# Patient Record
Sex: FEMALE | Race: WHITE | NOT HISPANIC OR LATINO | Employment: OTHER | URBAN - METROPOLITAN AREA
[De-identification: names, ages, dates, MRNs, and addresses within clinical notes are randomized per-mention and may not be internally consistent; named-entity substitution may affect disease eponyms.]

---

## 2017-10-30 ENCOUNTER — OFFICE VISIT (OUTPATIENT)
Dept: ORTHOPEDICS | Facility: CLINIC | Age: 67
End: 2017-10-30
Payer: MEDICARE

## 2017-10-30 VITALS
WEIGHT: 164 LBS | HEART RATE: 62 BPM | DIASTOLIC BLOOD PRESSURE: 64 MMHG | HEIGHT: 62 IN | SYSTOLIC BLOOD PRESSURE: 118 MMHG | BODY MASS INDEX: 30.18 KG/M2

## 2017-10-30 DIAGNOSIS — M54.31 SCIATICA OF RIGHT SIDE: Primary | ICD-10-CM

## 2017-10-30 PROCEDURE — 99213 OFFICE O/P EST LOW 20 MIN: CPT | Mod: ,,, | Performed by: ORTHOPAEDIC SURGERY

## 2017-10-30 PROCEDURE — 72100 X-RAY EXAM L-S SPINE 2/3 VWS: CPT | Mod: ,,, | Performed by: ORTHOPAEDIC SURGERY

## 2017-10-30 RX ORDER — EMPAGLIFLOZIN 25 MG/1
1 TABLET, FILM COATED ORAL DAILY
COMMUNITY
Start: 2017-10-25 | End: 2019-04-24 | Stop reason: SDUPTHER

## 2017-10-30 RX ORDER — PANTOPRAZOLE SODIUM 40 MG/1
40 TABLET, DELAYED RELEASE ORAL
COMMUNITY
Start: 2017-09-30 | End: 2021-02-15 | Stop reason: SDUPTHER

## 2017-10-30 RX ORDER — METHYLPREDNISOLONE 4 MG/1
TABLET ORAL
Qty: 1 PACKAGE | Refills: 0 | Status: SHIPPED | OUTPATIENT
Start: 2017-10-30 | End: 2019-04-24

## 2017-10-30 RX ORDER — HYDROCHLOROTHIAZIDE 25 MG/1
TABLET ORAL
COMMUNITY
Start: 2017-10-16 | End: 2019-04-24

## 2017-10-30 RX ORDER — CYCLOSPORINE 0.5 MG/ML
1 EMULSION OPHTHALMIC 2 TIMES DAILY
COMMUNITY

## 2017-10-30 RX ORDER — HYDROCORTISONE 25 MG/G
CREAM TOPICAL
COMMUNITY
Start: 2017-10-05 | End: 2019-04-24

## 2017-10-30 RX ORDER — GLIPIZIDE 5 MG/1
5 TABLET, FILM COATED, EXTENDED RELEASE ORAL
COMMUNITY
End: 2019-04-24 | Stop reason: SDUPTHER

## 2017-10-30 RX ORDER — LEVOCETIRIZINE DIHYDROCHLORIDE 5 MG/1
5 TABLET, FILM COATED ORAL DAILY PRN
COMMUNITY

## 2017-10-30 NOTE — PROGRESS NOTES
Past Medical History:   Diagnosis Date    Diabetes mellitus     Hypertension        Past Surgical History:   Procedure Laterality Date     SECTION      ROTATOR CUFF REPAIR         Current Outpatient Prescriptions   Medication Sig    cycloSPORINE (RESTASIS) 0.05 % ophthalmic emulsion Apply to eye.    diltiazem (CARDIZEM CD) 180 MG 24 hr capsule Take 180 mg by mouth once daily.    glipiZIDE (GLUCOTROL) 5 MG TR24 Take by mouth.    hydroCHLOROthiazide (HYDRODIURIL) 25 MG tablet     hydrocortisone 2.5 % cream     JARDIANCE 25 mg Tab     levocetirizine (XYZAL) 5 MG tablet Take by mouth.    pantoprazole (PROTONIX) 40 MG tablet     SITagliptan-metformin (JANUMET) 50-1,000 mg per tablet Take by mouth.     No current facility-administered medications for this visit.        Review of patient's allergies indicates:  No Known Allergies    Family History   Problem Relation Age of Onset    Diabetes Mellitus      Heart disease      Cancer         Social History     Social History    Marital status:      Spouse name: N/A    Number of children: N/A    Years of education: N/A     Occupational History    Not on file.     Social History Main Topics    Smoking status: Current Every Day Smoker     Packs/day: 0.50     Types: Cigarettes    Smokeless tobacco: Never Used      Comment: 0.5 ppd x30 years or more    Alcohol use No    Drug use: No    Sexual activity: Not on file     Other Topics Concern    Not on file     Social History Narrative    No narrative on file       Chief Complaint:   Chief Complaint   Patient presents with    Leg Pain     Patient has Right leg pain in the Lower Gluteal area. She states it hurts to sit for any length of time. The pain has been present for about 2-3 weeks. At the end of the day the leg has stinging and numbness all the way down the back of the leg in to the calf.       Consulting Physician: No ref. provider found    History of Present Illness:    This is a 67  "y.o. year old female who complains of Patient's been having increasingright buttocks and posterior leg pain for last 2 weeks she did not this does not have any history of an injury she says her pain is sometimes  in 7 out of 10      ROS    Examination:    Vital Signs:    Vitals:    10/30/17 1048   BP: 118/64   Pulse: 62   Weight: 74.4 kg (164 lb)   Height: 5' 2" (1.575 m)   PainSc:   7   PainLoc: Buttocks       This a well-developed, well nourished patient in no acute distress.    Alert and oriented and cooperative to examination.       Physical Exam: back-patient is able to reverse her curve well on straight leg raising she has some mild discomfort in the buttocks and goes into the posterior thigh does not appear to go down below the knee her deep tendon reflexes are intact and symmetrical is no motor weakness    Imaging: X-rays ordered and reviewed today x-ray examination of lumbar spine shows an L4-5 spondylolisthesis with a slight slip of L4 and L5 on L5     Assessment: L4-5 spondylolisthesis with right sided sciatica    Plan:  Will start patient on a Medrol Dosepak given some back exercises and reevaluate in about 2 weeks      DISCLAIMER: This note may have been dictated using voice recognition software and may contain grammatical errors.     NOTE: Consult report sent to referring provider via [facility] EMR.  "

## 2017-11-13 ENCOUNTER — OFFICE VISIT (OUTPATIENT)
Dept: ORTHOPEDICS | Facility: CLINIC | Age: 67
End: 2017-11-13
Payer: MEDICARE

## 2017-11-13 VITALS
SYSTOLIC BLOOD PRESSURE: 112 MMHG | WEIGHT: 164 LBS | HEIGHT: 62 IN | DIASTOLIC BLOOD PRESSURE: 72 MMHG | BODY MASS INDEX: 30.18 KG/M2 | HEART RATE: 60 BPM

## 2017-11-13 DIAGNOSIS — M54.31 SCIATICA OF RIGHT SIDE: Primary | ICD-10-CM

## 2017-11-13 PROCEDURE — 99213 OFFICE O/P EST LOW 20 MIN: CPT | Mod: ,,, | Performed by: ORTHOPAEDIC SURGERY

## 2017-11-13 RX ORDER — AZITHROMYCIN 250 MG/1
TABLET, FILM COATED ORAL
COMMUNITY
Start: 2017-11-09 | End: 2019-04-24

## 2017-11-13 NOTE — PROGRESS NOTES
Past Medical History:   Diagnosis Date    Diabetes mellitus     Hypertension        Past Surgical History:   Procedure Laterality Date     SECTION      ROTATOR CUFF REPAIR         Current Outpatient Prescriptions   Medication Sig    azithromycin (Z-MELISSA) 250 MG tablet     cycloSPORINE (RESTASIS) 0.05 % ophthalmic emulsion Apply to eye.    diltiazem (CARDIZEM CD) 180 MG 24 hr capsule Take 180 mg by mouth once daily.    glipiZIDE (GLUCOTROL) 5 MG TR24 Take by mouth.    hydroCHLOROthiazide (HYDRODIURIL) 25 MG tablet     hydrocortisone 2.5 % cream     JARDIANCE 25 mg Tab     levocetirizine (XYZAL) 5 MG tablet Take by mouth.    methylPREDNISolone (MEDROL DOSEPACK) 4 mg tablet use as directed    pantoprazole (PROTONIX) 40 MG tablet     SITagliptan-metformin (JANUMET) 50-1,000 mg per tablet Take by mouth.     No current facility-administered medications for this visit.        Review of patient's allergies indicates:  No Known Allergies    Family History   Problem Relation Age of Onset    Diabetes Mellitus      Heart disease      Cancer         Social History     Social History    Marital status:      Spouse name: N/A    Number of children: N/A    Years of education: N/A     Occupational History    Not on file.     Social History Main Topics    Smoking status: Current Every Day Smoker     Packs/day: 0.50     Types: Cigarettes    Smokeless tobacco: Never Used      Comment: 0.5 ppd x30 years or more    Alcohol use No    Drug use: No    Sexual activity: Not on file     Other Topics Concern    Not on file     Social History Narrative    No narrative on file       Chief Complaint:   Chief Complaint   Patient presents with    Follow-up     Patient came in 10/30/2017,  for right side leg pain dx'd with sciatica pain and placed on Medrol dose pack and given back exercises. She is doing wel       Consulting Physician: No ref. provider found    History of Present Illness:    This is a 67  "y.o. year old female who complains of patient returns for follow-up of some right-sided sciatica which was treated with Medrol Dosepak she states she has no complaints of any pain today and feels what really well      ROS    Examination:    Vital Signs:    Vitals:    11/13/17 0913   BP: 112/72   Pulse: 60   Weight: 74.4 kg (164 lb)   Height: 5' 2" (1.575 m)   PainSc:   1   PainLoc: Leg       This a well-developed, well nourished patient in no acute distress.    Alert and oriented and cooperative to examination.       Physical Exam: back-patient straight leg raise is negative bilaterally she is neurologically intact she has no gross motor weakness    Imaging: X-rays ordered and reviewed today no x-rays done today     Assessment: right-sided sciatica resolved    Plan:  He patient has been given back exercises she is to take Tylenol and Aleve as needed and return as needed      DISCLAIMER: This note may have been dictated using voice recognition software and may contain grammatical errors.     NOTE: Consult report sent to referring provider via EP[facility] EMR.  "

## 2018-12-30 ENCOUNTER — OFFICE VISIT (OUTPATIENT)
Dept: URGENT CARE | Facility: CLINIC | Age: 68
End: 2018-12-30
Payer: MEDICARE

## 2018-12-30 VITALS
HEIGHT: 62 IN | DIASTOLIC BLOOD PRESSURE: 82 MMHG | BODY MASS INDEX: 30.18 KG/M2 | HEART RATE: 70 BPM | TEMPERATURE: 98 F | WEIGHT: 164 LBS | OXYGEN SATURATION: 95 % | SYSTOLIC BLOOD PRESSURE: 140 MMHG

## 2018-12-30 DIAGNOSIS — B96.89 BACTERIAL SINUSITIS: ICD-10-CM

## 2018-12-30 DIAGNOSIS — R09.81 NASAL CONGESTION: Primary | ICD-10-CM

## 2018-12-30 DIAGNOSIS — J32.9 BACTERIAL SINUSITIS: ICD-10-CM

## 2018-12-30 LAB
CTP QC/QA: YES
FLUAV AG NPH QL: NEGATIVE
FLUBV AG NPH QL: NEGATIVE

## 2018-12-30 PROCEDURE — 99203 OFFICE O/P NEW LOW 30 MIN: CPT | Mod: S$GLB,,, | Performed by: PHYSICIAN ASSISTANT

## 2018-12-30 PROCEDURE — 87804 INFLUENZA ASSAY W/OPTIC: CPT | Mod: QW,S$GLB,, | Performed by: PHYSICIAN ASSISTANT

## 2018-12-30 RX ORDER — AMOXICILLIN AND CLAVULANATE POTASSIUM 875; 125 MG/1; MG/1
1 TABLET, FILM COATED ORAL 2 TIMES DAILY
Qty: 20 TABLET | Refills: 0 | Status: SHIPPED | OUTPATIENT
Start: 2018-12-30 | End: 2019-01-09

## 2018-12-30 RX ORDER — BENZONATATE 100 MG/1
100 CAPSULE ORAL EVERY 6 HOURS PRN
Qty: 60 CAPSULE | Refills: 1 | Status: SHIPPED | OUTPATIENT
Start: 2018-12-30 | End: 2019-04-24

## 2018-12-30 NOTE — PROGRESS NOTES
"Subjective:       Patient ID: Monique Craven is a 68 y.o. female.    Vitals:  height is 5' 2" (1.575 m) and weight is 74.4 kg (164 lb). Her tympanic temperature is 98 °F (36.7 °C). Her blood pressure is 140/82 (abnormal) and her pulse is 70. Her oxygen saturation is 95%.     Chief Complaint: Sinus Problem    Monique started with sinus congestion about 7 days ago and since it has gotten worse.       Sinus Problem   This is a new problem. The current episode started in the past 7 days. The problem has been gradually worsening since onset. There has been no fever. Her pain is at a severity of 0/10. She is experiencing no pain. Associated symptoms include congestion, coughing and sinus pressure. Pertinent negatives include no chills, diaphoresis, ear pain, shortness of breath or sore throat. Past treatments include acetaminophen. The treatment provided mild relief.       Constitution: Negative for chills, sweating, fatigue and fever.   HENT: Positive for congestion and sinus pressure. Negative for ear pain, sinus pain, sore throat and voice change.    Neck: Negative for painful lymph nodes.   Eyes: Negative for eye redness.   Respiratory: Positive for cough. Negative for chest tightness, sputum production, bloody sputum, COPD, shortness of breath, stridor, wheezing and asthma.    Gastrointestinal: Negative for nausea and vomiting.   Musculoskeletal: Negative for muscle ache.   Skin: Negative for rash.   Allergic/Immunologic: Negative for seasonal allergies and asthma.   Hematologic/Lymphatic: Negative for swollen lymph nodes.       Objective:      Physical Exam   Constitutional: She is oriented to person, place, and time. She appears well-developed and well-nourished. She is cooperative.  Non-toxic appearance. She does not appear ill. No distress.   HENT:   Head: Normocephalic and atraumatic.   Right Ear: Hearing, external ear and ear canal normal. Tympanic membrane is bulging (Clear).   Left Ear: Hearing, external " ear and ear canal normal. Tympanic membrane is bulging (Clear).   Nose: No mucosal edema, rhinorrhea or nasal deformity. No epistaxis. Right sinus exhibits maxillary sinus tenderness. Right sinus exhibits no frontal sinus tenderness. Left sinus exhibits maxillary sinus tenderness. Left sinus exhibits no frontal sinus tenderness.   Mouth/Throat: Uvula is midline, oropharynx is clear and moist and mucous membranes are normal. No trismus in the jaw. Normal dentition. No uvula swelling. No posterior oropharyngeal erythema.   Eyes: Conjunctivae and lids are normal. No scleral icterus.   Sclera clear bilat   Neck: Trachea normal, full passive range of motion without pain and phonation normal. Neck supple.   Cardiovascular: Normal rate, regular rhythm, normal heart sounds, intact distal pulses and normal pulses.   Pulmonary/Chest: Effort normal and breath sounds normal. No respiratory distress.   Abdominal: Soft. Normal appearance and bowel sounds are normal. She exhibits no distension. There is no tenderness.   Musculoskeletal: Normal range of motion. She exhibits no edema or deformity.   Neurological: She is alert and oriented to person, place, and time. She exhibits normal muscle tone. Coordination normal.   Skin: Skin is warm, dry and intact. She is not diaphoretic. No pallor.   Psychiatric: She has a normal mood and affect. Her speech is normal and behavior is normal. Judgment and thought content normal. Cognition and memory are normal.   Nursing note and vitals reviewed.      Assessment:       1. Nasal congestion    2. Bacterial sinusitis        Plan:         Nasal congestion  -     POCT Influenza A/B    Bacterial sinusitis    Other orders  -     amoxicillin-clavulanate 875-125mg (AUGMENTIN) 875-125 mg per tablet; Take 1 tablet by mouth 2 (two) times daily. for 10 days  Dispense: 20 tablet; Refill: 0  -     benzonatate (TESSALON PERLES) 100 MG capsule; Take 1 capsule (100 mg total) by mouth every 6 (six) hours as  needed.  Dispense: 60 capsule; Refill: 1    You must understand that you've received an Urgent Care treatment only and that you may be released before all your medical problems are known or treated. You, the patient, will arrange for follow up care as instructed.  Follow up with your PCP or specialty clinic as directed in the next 1-2 weeks if not improved or as needed.  You can call [phone] to schedule an appointment with the appropriate provider.  If your condition worsens we recommend that you receive another evaluation at the emergency room immediately or contact your primary medical clinics after hours call service to discuss your concerns.  Please return here or go to the Emergency Department for any concerns or worsening of condition.

## 2018-12-30 NOTE — PATIENT INSTRUCTIONS
Sinusitis (Antibiotic Treatment)    The sinuses are air-filled spaces within the bones of the face. They connect to the inside of the nose. Sinusitis is an inflammation of the tissue lining the sinus cavity. Sinus inflammation can occur during a cold. It can also be due to allergies to pollens and other particles in the air. Sinusitis can cause symptoms of sinus congestion and fullness. A sinus infection causes fever, headache and facial pain. There is often green or yellow drainage from the nose or into the back of the throat (post-nasal drip). You have been given antibiotics to treat this condition.  Home care:  · Take the full course of antibiotics as instructed. Do not stop taking them, even if you feel better.  · Drink plenty of water, hot tea, and other liquids. This may help thin mucus. It also may promote sinus drainage.  · Heat may help soothe painful areas of the face. Use a towel soaked in hot water. Or,  the shower and direct the hot spray onto your face. Using a vaporizer along with a menthol rub at night may also help.   · An expectorant containing guaifenesin may help thin the mucus and promote drainage from the sinuses.  · Over-the-counter decongestants may be used unless a similar medicine was prescribed. Nasal sprays work the fastest. Use one that contains phenylephrine or oxymetazoline. First blow the nose gently. Then use the spray. Do not use these medicines more often than directed on the label or symptoms may get worse. You may also use tablets containing pseudoephedrine. Avoid products that combine ingredients, because side effects may be increased. Read labels. You can also ask the pharmacist for help. (NOTE: Persons with high blood pressure should not use decongestants. They can raise blood pressure.)  · Over-the-counter antihistamines may help if allergies contributed to your sinusitis.    · Do not use nasal rinses or irrigation during an acute sinus infection, unless told to by  your health care provider. Rinsing may spread the infection to other sinuses.  · Use acetaminophen or ibuprofen to control pain, unless another pain medicine was prescribed. (If you have chronic liver or kidney disease or ever had a stomach ulcer, talk with your doctor before using these medicines. Aspirin should never be used in anyone under 18 years of age who is ill with a fever. It may cause severe liver damage.)  · Don't smoke. This can worsen symptoms.  Follow-up care  Follow up with your healthcare provider or our staff if you are not improving within the next week.  When to seek medical advice  Call your healthcare provider if any of these occur:  · Facial pain or headache becoming more severe  · Stiff neck  · Unusual drowsiness or confusion  · Swelling of the forehead or eyelids  · Vision problems, including blurred or double vision  · Fever of 100.4ºF (38ºC) or higher, or as directed by your healthcare provider  · Seizure  · Breathing problems  · Symptoms not resolving within 10 days  Date Last Reviewed: 4/13/2015  © 9179-5538 [employer]. [address] [place] [address], [place] [address]. All rights reserved. This information is not intended as a substitute for professional medical care. Always follow your healthcare professional's instructions.

## 2019-01-02 ENCOUNTER — TELEPHONE (OUTPATIENT)
Dept: URGENT CARE | Facility: CLINIC | Age: 69
End: 2019-01-02

## 2019-06-10 LAB — CRC RECOMMENDATION EXT: NORMAL

## 2019-06-20 PROBLEM — R60.0 LOWER EXTREMITY EDEMA: Status: ACTIVE | Noted: 2019-06-20

## 2019-06-20 PROBLEM — I83.893 VARICOSE VEINS OF BOTH LEGS WITH EDEMA: Status: ACTIVE | Noted: 2019-06-20

## 2020-06-24 DIAGNOSIS — Z12.31 ENCOUNTER FOR SCREENING MAMMOGRAM FOR MALIGNANT NEOPLASM OF BREAST: Primary | ICD-10-CM

## 2020-07-08 ENCOUNTER — HOSPITAL ENCOUNTER (OUTPATIENT)
Dept: RADIOLOGY | Facility: HOSPITAL | Age: 70
Discharge: HOME | End: 2020-07-08
Attending: SPECIALIST
Payer: MEDICARE

## 2020-07-08 VITALS — WEIGHT: 160.06 LBS | HEIGHT: 62 IN | BODY MASS INDEX: 29.45 KG/M2

## 2020-07-08 DIAGNOSIS — Z12.31 ENCOUNTER FOR SCREENING MAMMOGRAM FOR MALIGNANT NEOPLASM OF BREAST: ICD-10-CM

## 2020-07-08 PROCEDURE — 77067 SCR MAMMO BI INCL CAD: CPT | Mod: TC,PO

## 2021-02-15 PROBLEM — R60.0 LOWER EXTREMITY EDEMA: Status: RESOLVED | Noted: 2019-06-20 | Resolved: 2021-02-15

## 2021-02-15 PROBLEM — I83.893 VARICOSE VEINS OF BOTH LEGS WITH EDEMA: Status: RESOLVED | Noted: 2019-06-20 | Resolved: 2021-02-15

## 2021-09-08 ENCOUNTER — HOSPITAL ENCOUNTER (OUTPATIENT)
Dept: RADIOLOGY | Facility: HOSPITAL | Age: 71
Discharge: HOME | End: 2021-09-08
Attending: INTERNAL MEDICINE
Payer: MEDICARE

## 2021-09-08 VITALS — BODY MASS INDEX: 28.36 KG/M2 | HEIGHT: 62 IN | WEIGHT: 154.13 LBS

## 2021-09-08 PROCEDURE — 77067 SCR MAMMO BI INCL CAD: CPT | Mod: TC,PO

## 2022-02-09 PROBLEM — J42 CHRONIC BRONCHITIS, UNSPECIFIED CHRONIC BRONCHITIS TYPE: Status: ACTIVE | Noted: 2022-02-09

## 2022-02-23 PROBLEM — Z86.16 HISTORY OF 2019 NOVEL CORONAVIRUS DISEASE (COVID-19): Status: ACTIVE | Noted: 2022-02-23

## 2022-02-23 PROBLEM — J20.8 ACUTE BRONCHITIS DUE TO OTHER SPECIFIED ORGANISMS: Status: ACTIVE | Noted: 2022-02-23

## 2022-03-03 ENCOUNTER — PATIENT OUTREACH (OUTPATIENT)
Dept: ADMINISTRATIVE | Facility: HOSPITAL | Age: 72
End: 2022-03-03
Payer: MEDICARE

## 2022-03-03 NOTE — PROGRESS NOTES
[facility] CMS chart audits. Chart review completed for  test overdue (mammograms, Colonoscopies, pap smears, DM labs, and/or EYE EXAMs)       Care Everywhere and media, updates requested and reviewed.             The patient has not had a Flu vaccine for the 2021 measurement year.    HM updated with colonoscopy from media.

## 2022-10-03 DIAGNOSIS — Z12.31 ENCOUNTER FOR SCREENING MAMMOGRAM FOR MALIGNANT NEOPLASM OF BREAST: Primary | ICD-10-CM

## 2022-10-26 ENCOUNTER — HOSPITAL ENCOUNTER (OUTPATIENT)
Dept: RADIOLOGY | Facility: HOSPITAL | Age: 72
Discharge: HOME | End: 2022-10-26
Attending: SPECIALIST
Payer: MEDICARE

## 2022-10-26 DIAGNOSIS — Z12.31 ENCOUNTER FOR SCREENING MAMMOGRAM FOR MALIGNANT NEOPLASM OF BREAST: ICD-10-CM

## 2022-10-26 PROCEDURE — 77063 BREAST TOMOSYNTHESIS BI: CPT | Mod: TC,PO

## 2023-02-14 PROBLEM — J20.8 ACUTE BRONCHITIS DUE TO OTHER SPECIFIED ORGANISMS: Status: RESOLVED | Noted: 2022-02-23 | Resolved: 2023-02-14

## 2023-02-14 PROBLEM — E66.3 OVERWEIGHT (BMI 25.0-29.9): Status: ACTIVE | Noted: 2023-02-14

## 2023-02-14 PROBLEM — E11.65 TYPE 2 DIABETES MELLITUS WITH HYPERGLYCEMIA, WITHOUT LONG-TERM CURRENT USE OF INSULIN: Status: ACTIVE | Noted: 2023-02-14

## 2023-08-13 PROBLEM — M81.0 OSTEOPOROSIS, SENILE: Status: ACTIVE | Noted: 2023-08-13

## 2023-08-28 PROBLEM — I73.9 PERIPHERAL VASCULAR DISEASE, UNSPECIFIED: Status: ACTIVE | Noted: 2023-08-28

## 2024-01-02 DIAGNOSIS — Z12.31 ENCOUNTER FOR SCREENING MAMMOGRAM FOR MALIGNANT NEOPLASM OF BREAST: Primary | ICD-10-CM

## 2024-01-08 ENCOUNTER — HOSPITAL ENCOUNTER (OUTPATIENT)
Dept: RADIOLOGY | Facility: HOSPITAL | Age: 74
Discharge: HOME | End: 2024-01-08
Attending: SPECIALIST
Payer: MEDICARE

## 2024-01-08 VITALS — BODY MASS INDEX: 26.98 KG/M2 | WEIGHT: 146.63 LBS | HEIGHT: 62 IN

## 2024-01-08 DIAGNOSIS — Z12.31 ENCOUNTER FOR SCREENING MAMMOGRAM FOR MALIGNANT NEOPLASM OF BREAST: ICD-10-CM

## 2024-01-08 PROCEDURE — 77067 SCR MAMMO BI INCL CAD: CPT | Mod: TC,PO

## 2025-01-28 DIAGNOSIS — Z12.31 ENCOUNTER FOR SCREENING MAMMOGRAM FOR MALIGNANT NEOPLASM OF BREAST: Primary | ICD-10-CM

## 2025-03-10 ENCOUNTER — HOSPITAL ENCOUNTER (OUTPATIENT)
Dept: RADIOLOGY | Facility: HOSPITAL | Age: 75
Discharge: HOME | End: 2025-03-10
Attending: STUDENT IN AN ORGANIZED HEALTH CARE EDUCATION/TRAINING PROGRAM
Payer: MEDICARE

## 2025-03-10 VITALS — WEIGHT: 146 LBS | BODY MASS INDEX: 26.87 KG/M2 | HEIGHT: 62 IN

## 2025-03-10 DIAGNOSIS — Z12.31 ENCOUNTER FOR SCREENING MAMMOGRAM FOR MALIGNANT NEOPLASM OF BREAST: ICD-10-CM

## 2025-03-10 PROCEDURE — 77067 SCR MAMMO BI INCL CAD: CPT | Mod: 26,,, | Performed by: RADIOLOGY

## 2025-03-10 PROCEDURE — 77063 BREAST TOMOSYNTHESIS BI: CPT | Mod: 26,,, | Performed by: RADIOLOGY

## 2025-03-10 PROCEDURE — 77067 SCR MAMMO BI INCL CAD: CPT | Mod: TC,PO

## 2025-05-27 RX ORDER — MONTELUKAST SODIUM 5 MG/1
5 TABLET, CHEWABLE ORAL DAILY
Qty: 90 TABLET | Refills: 3 | Status: SHIPPED | OUTPATIENT
Start: 2025-05-27

## 2025-05-27 RX ORDER — BUDESONIDE AND FORMOTEROL FUMARATE DIHYDRATE 80; 4.5 UG/1; UG/1
2 AEROSOL RESPIRATORY (INHALATION) 2 TIMES DAILY
Qty: 10.2 G | Refills: 3 | Status: SHIPPED | OUTPATIENT
Start: 2025-05-27